# Patient Record
(demographics unavailable — no encounter records)

---

## 2024-12-04 NOTE — COUNSELING
[Potential consequences of obesity discussed] : Potential consequences of obesity discussed [Encouraged to maintain food diary] : Encouraged to maintain food diary [Encouraged to increase physical activity] : Encouraged to increase physical activity [Weigh Self Weekly] : weigh self weekly [Good understanding] : Patient has a good understanding of disease, goals and obesity follow-up plan [FreeTextEntry4] : 15

## 2024-12-04 NOTE — HISTORY OF PRESENT ILLNESS
[FreeTextEntry1] : CPE  [de-identified] : 53M h/o anxiety, HTN, HLD, Hypothyroidism, ED, obesity, here for CPE

## 2024-12-04 NOTE — HEALTH RISK ASSESSMENT
[Good] : ~his/her~  mood as  good [Yes] : Yes [2 - 4 times a month (2 pts)] : 2-4 times a month (2 points) [1 or 2 (0 pts)] : 1 or 2 (0 points) [Never (0 pts)] : Never (0 points) [0] : 2) Feeling down, depressed, or hopeless: Not at all (0) [PHQ-2 Negative - No further assessment needed] : PHQ-2 Negative - No further assessment needed [Never] : Never [HIV test declined] : HIV test declined [Hepatitis C test declined] : Hepatitis C test declined [Alone] : lives alone [Employed] : employed [Significant Other] : lives with significant other [Sexually Active] : sexually active [Feels Safe at Home] : Feels safe at home [Fully functional (bathing, dressing, toileting, transferring, walking, feeding)] : Fully functional (bathing, dressing, toileting, transferring, walking, feeding) [Fully functional (using the telephone, shopping, preparing meals, housekeeping, doing laundry, using] : Fully functional and needs no help or supervision to perform IADLs (using the telephone, shopping, preparing meals, housekeeping, doing laundry, using transportation, managing medications and managing finances) [Reports changes in hearing] : Reports no changes in hearing [Reports changes in vision] : Reports no changes in vision [FreeTextEntry2] :

## 2024-12-04 NOTE — ASSESSMENT
[FreeTextEntry1] : .   CPE Labs  EKG performed today in clinic personally reviewed. No ST changes, no T Wave inversions, good R wave progression.  HTN Blood pressures not at goal. Home BPs 140s-160s/80s-90s. BP elevated today on triage and repeat.  Current regimen: Valsartan-HCTZ 320-25, Metoprolol ER 50. Check renal US doppler Add Spironolactone 25mg. Expected results and possible side effects of medications were discussed today with the patient. All questions were answered and the patient agrees with the plan. Encourage DASH diet - does not add salt, avoids red meats. Cooks at home.  Encourage moderate intensity exercise 30-45 minutes per day for 5x/week Encourage BP log journal using over the counter home BP monitor. Instructed on proper BP measuring technique. Return 2-3 weeks for re-check   ED on viagra uses 5x per week with partner tolerating well. Refill.   Anxiety was on Venlafaxine 2022 stopped taking, does not remember why he stopped does not recall any particular side effects. Controlled off medication, uses meditation. Not interested in further meds for anxiety at this time.   Asthma reportedly on 2 different inhalers PRN but only listed medication is albuterol, patient unsure of name. Has albuterol PRN wheezing exclusively with respiratory infections otherwise controlled. Refill albuterol.   Performed ear wax removal with water + hydrogen peroxide lavage and curette instrumentation with patient's informed consent after discussion of possible risks and benefits. Patient tolerated the procedure well. Tympanic membrane intact after procedure.   HLD check lipids renew statin Hypothyroidism check TSH renew synthroid Hx of abnormal iron levels and polycythemia check CBC   Reviewed diet, exercise (150 min/moderate intensity exercise weekly), lifestyle modifications. Discussed the benefits of weight loss with pt, and risks associated with obesity. Pt is receptive to lifestyle modification techniques to lose weight - at least 30mins-1hr aerobic exercises/day x5 days per week advised. Decreased food portion sizes, increase in whole grains, assorted vegetables and fruits and decreased sugar beverages discussed and encouraged   Colon Cancer Screening- GI referral never had colonoscopy. Possible anoscopy? in 2015   Flu vaccine

## 2025-07-17 NOTE — HISTORY OF PRESENT ILLNESS
[Home] : at home, [unfilled] , at the time of the visit. [Medical Office: ([facility])___] : at the medical office located in  [Telehealth (audio & video)] : This visit was provided via telehealth using real-time 2-way audio visual technology. [FreeTextEntry8] : 53M h/o anxiety, HTN, HLD, Hypothyroidism, ED, obesity here for acute telehealth visit.   Patient has been engaged for 3 years, found out 3 weeks ago she has been cheating on him. His stress levels are very high. He cut off all communication, but heard they are causing issues for him on social media. His ex-wife and other partners have also cheated on him. He is trying to readjust his life without her but is having extremely high episodes of stress when he is reminded of her and the events that have unfolded.

## 2025-07-17 NOTE — ASSESSMENT
[FreeTextEntry1] :  Acute stress disorder no SI or HI  Xanax 0.5mg BID prn x 30 days, use for periods of extreme stress and with sleep assistance.  I discussed with Tyler in detail that Xanax should not be used for long term. If long term medications are needed we can restart him on Prozac which he has tolerated well in the past. Expected results and possible side effects of medications were discussed today with the patient. All questions were answered and the patient agrees with the plan.